# Patient Record
Sex: MALE | Race: WHITE
[De-identification: names, ages, dates, MRNs, and addresses within clinical notes are randomized per-mention and may not be internally consistent; named-entity substitution may affect disease eponyms.]

---

## 2019-11-25 ENCOUNTER — HOSPITAL ENCOUNTER (EMERGENCY)
Dept: HOSPITAL 97 - ER | Age: 39
Discharge: HOME | End: 2019-11-25
Payer: COMMERCIAL

## 2019-11-25 VITALS — SYSTOLIC BLOOD PRESSURE: 108 MMHG | OXYGEN SATURATION: 97 % | DIASTOLIC BLOOD PRESSURE: 70 MMHG

## 2019-11-25 VITALS — TEMPERATURE: 98.4 F
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
## 2019-11-25 DIAGNOSIS — S40.021A: ICD-10-CM

## 2019-11-25 DIAGNOSIS — S20.221A: Primary | ICD-10-CM
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
## 2019-11-25 LAB
BUN BLD-MCNC: 12 MG/DL (ref 7–18)
GLUCOSE SERPLBLD-MCNC: 99 MG/DL (ref 74–106)
HCT VFR BLD CALC: 39.1 % (ref 39.6–49)
LYMPHOCYTES # SPEC AUTO: 1.2 K/UL (ref 0.7–4.9)
PMV BLD: 8.3 FL (ref 7.6–11.3)
POTASSIUM SERPL-SCNC: 3.8 MMOL/L (ref 3.5–5.1)
RBC # BLD: 4.24 M/UL (ref 4.33–5.43)

## 2019-11-25 PROCEDURE — 36415 COLL VENOUS BLD VENIPUNCTURE: CPT

## 2019-11-25 PROCEDURE — 96374 THER/PROPH/DIAG INJ IV PUSH: CPT

## 2019-11-25 PROCEDURE — 96375 TX/PRO/DX INJ NEW DRUG ADDON: CPT

## 2019-11-25 PROCEDURE — 85025 COMPLETE CBC W/AUTO DIFF WBC: CPT

## 2019-11-25 PROCEDURE — 74177 CT ABD & PELVIS W/CONTRAST: CPT

## 2019-11-25 PROCEDURE — 80048 BASIC METABOLIC PNL TOTAL CA: CPT

## 2019-11-25 PROCEDURE — 99284 EMERGENCY DEPT VISIT MOD MDM: CPT

## 2019-11-25 NOTE — ER
Nurse's Notes                                                                                     

 [facility]                                                                 

Name: Arnold Patricia Jr                                                                        

Age: 39 yrs                                                                                       

Sex: Male                                                                                         

: 1980                                                                                   

MRN: [id number]                                                                                   

Arrival Date: 2019                                                                          

Time: 12:13                                                                                       

Account#: [id number]                                                                            

Bed 20                                                                                            

Private MD: Unknown, Unknown                                                                      

Diagnosis: Contusion of right back wall of thorax;Contusion of right upper arm                    

                                                                                                  

Presentation:                                                                                     

                                                                                             

12:14 Presenting complaint: Patient states: at about 0500 I fell down the stairs at home from la1 

      the second to first story, pain in lower back, denies any numbness or loss of               

      continence. Transition of care: patient was not received from another setting of care.      

      Onset of symptoms was 2019. Risk Assessment: Do you want to hurt yourself      

      or someone else? Patient reports no desire to harm self or others. Initial Sepsis           

      Screen: Does the patient meet any 2 criteria? No. Patient's initial sepsis screen is        

      negative. Does the patient have a suspected source of infection? No. Patient's initial      

      sepsis screen is negative. Care prior to arrival: None.                                     

12:14 Method Of Arrival: Wheelchair                                                           la1 

12:14 Acuity: MAIDA 3                                                                           la1 

                                                                                                  

Triage Assessment:                                                                                

12:31 General: Appears in no apparent distress. uncomfortable, Behavior is cooperative,       la1 

      appropriate for age, anxious. Pain: Complains of pain in back. EENT: No deficits noted.     

      Neuro: No deficits noted. Cardiovascular: No deficits noted. Respiratory: No deficits       

      noted. GI: No signs and/or symptoms were reported involving the gastrointestinal            

      system. : No signs and/or symptoms were reported regarding the genitourinary system.      

      Derm: No deficits noted. Musculoskeletal: Circulation, motion, and sensation intact.        

      Range of motion: intact in all extremities.                                                 

                                                                                                  

Historical:                                                                                       

- Allergies:                                                                                      

12:15 No Known Allergies;                                                                     la1 

- PMHx:                                                                                           

12:15 None;                                                                                   la1 

                                                                                                  

- Immunization history:: Adult Immunizations up to date.                                          

- Social history:: Smoking status: Patient/guardian denies using tobacco.                         

- Ebola Screening: : No symptoms or risks identified at this time.                                

                                                                                                  

                                                                                                  

Screenin:38 Abuse screen: Denies threats or abuse. Denies injuries from another. Nutritional        la1 

      screening: No deficits noted. Tuberculosis screening: No symptoms or risk factors           

      identified. Fall Risk None identified.                                                      

                                                                                                  

Assessment:                                                                                       

12:37 General: SEE TRIAGE NOTE.                                                               la1 

14:29 Reassessment: ALL CURRENT ORDERS COMPLETED, RESULTS PENDING FOR DISPO.                  bp  

15:09 Reassessment: PT D/C HOME AMBULATORY, DX WITH BACK CONTUSION.                           bp  

                                                                                                  

Vital Signs:                                                                                      

12:15  / 73; Pulse 89; Resp 16; Temp 98.4; Pulse Ox 100% on R/A; Weight 108.86 kg;      la1 

      Height 6 ft. 0 in. (182.88 cm);                                                             

14:29  / 70; Pulse 75; Resp 16; Pulse Ox 97% ;                                          bp  

12:15 Body Mass Index 32.55 (108.86 kg, 182.88 cm)                                            la1 

                                                                                                  

ED Course:                                                                                        

12:13 Patient arrived in ED.                                                                  ag5 

12:13 Unknown, Unknown is Private Physician.                                                  ag5 

12:15 Triage completed.                                                                       la1 

12:15 Arm band placed on left wrist.                                                          la1 

12:23 Jovanni Hernandez PA is PHCP.                                                               [id number] 

12:23 Darrius Sheth MD is Attending Physician.                                             [id number] 

12:27 Gil Law, KIM is Primary Nurse.                                                       la1 

12:38 Patient has correct armband on for positive identification. Bed in low position. Call   la1 

      light in reach. Side rails up X2.                                                           

12:50 Inserted saline lock: 20 gauge in right antecubital area, using aseptic technique.      la1 

      Blood collected.                                                                            

13:59 CT Abd/Pelvis - IV Contrast Only In Process Unspecified.                                EDMS

15:09 No provider procedures requiring assistance completed. IV discontinued, intact,         bp  

      bleeding controlled, No redness/swelling at site. Pressure dressing applied.                

                                                                                                  

Administered Medications:                                                                         

12:50 Drug: fentaNYL (PF) 75 mcg Route: IVP; Site: right antecubital;                         la1 

14:30 Follow up: Response: Pain is decreased                                                  bp  

12:50 Drug: Zofran 4 mg Route: IVP; Site: right antecubital;                                  la1 

14:30 Follow up: Response: No adverse reaction                                                bp  

                                                                                                  

                                                                                                  

Outcome:                                                                                          

14:43 Discharge ordered by MD.                                                                [id number] 

15:09 Discharged to home ambulatory.                                                          bp  

15:09 Condition: stable                                                                           

15:09 Discharge instructions given to patient, Instructed on discharge instructions, follow       

      up and referral plans. medication usage, Demonstrated understanding of instructions,        

      follow-up care, medications, Prescriptions given X 3.                                       

15:10 Patient left the ED.                                                                    bp  

                                                                                                  

Signatures:                                                                                       

Dispatcher [employer]                           EDMS                                                 

Jovanni Hernandez PA PA   jr8                                                  

Gil Law RN                         RN   la1                                                  

Cory Sevilla RN                      RN                                                      

Cheyanne Eric                                5                                                  

                                                                                                  

Corrections: (The following items were deleted from the chart)                                    

12:18 12:14 Presenting complaint: Patient states: at about 0500 I fell down the stairs at     la1 

      home from the second to first story, pain in lower back, denies any numbness or loss of     

      continence [facility]                                                                              

                                                                                                  

**************************************************************************************************

## 2019-11-25 NOTE — EDPHYS
Physician Documentation                                                                           

 [facility]                                                                 

Name: Arnold Patricia Jr                                                                        

Age: 39 yrs                                                                                       

Sex: Male                                                                                         

: 1980                                                                                   

MRN: [id number]                                                                                   

Arrival Date: 2019                                                                          

Time: 12:13                                                                                       

Account#: [id number]                                                                            

Bed 20                                                                                            

Private MD: Unknown, Unknown                                                                      

ED Physician Darrius Sheth                                                                      

HPI:                                                                                              

                                                                                             

14:59 This 39 yrs old  Male presents to ER via Wheelchair with complaints of Fall    jr8 

      Injury, Back Injury.                                                                        

14:59 Details of fall: The patient fell from a height, down approximately 10 stairs. Onset:   jr8 

      The symptoms/episode began/occurred acutely, today. Associated injuries: The patient        

      sustained injury to the low back, right arm. Severity of symptoms: At their worst the       

      symptoms were moderate, in the emergency department the symptoms are unchanged. The         

      patient has not experienced similar symptoms in the past. The patient has not recently      

      seen a physician. Patient stated that he was going down his stairs at house. Tripped on     

      one and fell sliding down the rest. Pain to right arm and low back. Denies LOC .            

                                                                                                  

Historical:                                                                                       

- Allergies:                                                                                      

12:15 No Known Allergies;                                                                     la1 

- PMHx:                                                                                           

12:15 None;                                                                                   la1 

                                                                                                  

- Immunization history:: Adult Immunizations up to date.                                          

- Social history:: Smoking status: Patient/guardian denies using tobacco.                         

- Ebola Screening: : No symptoms or risks identified at this time.                                

                                                                                                  

                                                                                                  

ROS:                                                                                              

14:59 Eyes: Negative for injury, pain, redness, and discharge, ENT: Negative for injury,      jr8 

      pain, and discharge, Neck: Negative for injury, pain, and swelling, Cardiovascular:         

      Negative for chest pain, palpitations, and edema, Respiratory: Negative for shortness       

      of breath, cough, wheezing, and pleuritic chest pain, Abdomen/GI: Negative for              

      abdominal pain, nausea, vomiting, diarrhea, and constipation, Skin: Negative for            

      injury, rash, and discoloration, Neuro: Negative for headache, weakness, numbness,          

      tingling, and seizure.                                                                      

14:59 Back: Positive for pain at rest, pain with movement, of the lumbar area and low back        

      area.                                                                                       

14:59 MS/extremity: Positive for ecchymosis, pain, tenderness, of the right arm.                  

                                                                                                  

Exam:                                                                                             

15:05 Head/Face:  Normocephalic, atraumatic. Eyes:  Pupils equal round and reactive to light, jr8 

      extra-ocular motions intact.  Lids and lashes normal.  Conjunctiva and sclera are           

      non-icteric and not injected.  Cornea within normal limits.  Periorbital areas with no      

      swelling, redness, or edema. ENT:  Nares patent. No nasal discharge, no septal              

      abnormalities noted.  Tympanic membranes are normal and external auditory canals are        

      clear.  Oropharynx with no redness, swelling, or masses, exudates, or evidence of           

      obstruction, uvula midline.  Mucous membranes moist. Neck:  Trachea midline, no             

      thyromegaly or masses palpated, and no cervical lymphadenopathy.  Supple, full range of     

      motion without nuchal rigidity, or vertebral point tenderness.  No Meningismus.             

      Chest/axilla:  Normal chest wall appearance and motion.  Nontender with no deformity.       

      No lesions are appreciated. Cardiovascular:  Regular rate and rhythm with a normal S1       

      and S2.  No gallops, murmurs, or rubs.  Normal PMI, no JVD.  No pulse deficits.             

      Respiratory:  Lungs have equal breath sounds bilaterally, clear to auscultation and         

      percussion.  No rales, rhonchi or wheezes noted.  No increased work of breathing, no        

      retractions or nasal flaring. Abdomen/GI:  Soft, non-tender, with normal bowel sounds.      

      No distension or tympany.  No guarding or rebound.  No evidence of tenderness               

      throughout. Skin:  Warm, dry with normal turgor.  Normal color with no rashes, no           

      lesions, and no evidence of cellulitis. Neuro:  Awake and alert, GCS 15, oriented to        

      person, place, time, and situation.  Cranial nerves II-XII grossly intact.  Motor           

      strength 5/5 in all extremities.  Sensory grossly intact.  Cerebellar exam normal.          

      Normal gait.                                                                                

15:05 Back: pain, that is moderate, of the  lumbar area and low back area, ROM is painful,        

      normal spinal alignment noted, bruising noted to left mid back.                             

15:05 Musculoskeletal/extremity: Extremities: grossly normal except: noted in the right arm:      

      ecchymosis, right forearm and biceps region , ROM: intact in all extremities,               

      Circulation is intact in all extremities. Sensation intact.                                 

                                                                                                  

Vital Signs:                                                                                      

12:15  / 73; Pulse 89; Resp 16; Temp 98.4; Pulse Ox 100% on R/A; Weight 108.86 kg;      la1 

      Height 6 ft. 0 in. (182.88 cm);                                                             

14:29  / 70; Pulse 75; Resp 16; Pulse Ox 97% ;                                          bp  

12:15 Body Mass Index 32.55 (108.86 kg, 182.88 cm)                                            la1 

                                                                                                  

MDM:                                                                                              

12:23 Patient medically screened.                                                             jr8 

15:07 Data reviewed: vital signs, nurses notes, radiologic studies, CT scan. Data             jr8 

      interpreted: Pulse oximetry: on room air is 97 %. Interpretation: normal. Counseling: I     

      had a detailed discussion with the patient and/or guardian regarding: the historical        

      points, exam findings, and any diagnostic results supporting the discharge/admit            

      diagnosis, radiology results, the need for outpatient follow up, a family practitioner,     

      to return to the emergency department if symptoms worsen or persist or if there are any     

      questions or concerns that arise at home.                                                   

                                                                                                  

                                                                                             

12:37 Order name: CBC with Diff; Complete Time: 15:07                                         jr8 

                                                                                             

12:37 Order name: Basic Metabolic Panel; Complete Time: 14:00                                 jr8 

                                                                                             

12:37 Order name: IV; Complete Time: 12:53                                                    jr8 

                                                                                             

12:37 Order name: CT Abd/Pelvis - IV Contrast Only; Complete Time: 14:33                      jr8 

                                                                                                  

Administered Medications:                                                                         

12:50 Drug: fentaNYL (PF) 75 mcg Route: IVP; Site: right antecubital;                         la1 

14:30 Follow up: Response: Pain is decreased                                                  bp  

12:50 Drug: Zofran 4 mg Route: IVP; Site: right antecubital;                                  la1 

14:30 Follow up: Response: No adverse reaction                                                bp  

                                                                                                  

                                                                                                  

Disposition:                                                                                      

18:20 Co-signature as Attending Physician, Darrius Sheth MD Did not see or evaluate patient. ps1 

      Signing chart for administrative purposes. Not an endorsement of care provided. .           

                                                                                                  

Disposition:                                                                                      

19 14:43 Discharged to Home. Impression: Contusion of right back wall of thorax,            

  Contusion of right upper arm.                                                                   

- Condition is Stable.                                                                            

- Discharge Instructions: Contusion.                                                              

- Prescriptions for Ibuprofen 800 mg Oral Tablet - take 1 tablet by ORAL route every 12           

  hours As needed take with food; 20 tablet. Tylenol- Codeine #3 300-30 mg Oral Tablet            

  - take 2 tablets by ORAL route every 6 hours As needed; 12 tablet. Robaxin 500 mg               

  Oral Tablet - take 2 tablet by ORAL route every 6 hours As needed; 40 tablet.                   

- Medication Reconciliation Form, Thank You Letter, Antibiotic Education, Prescription            

  Opioid Use form.                                                                                

- Follow up: Private Physician; When: 2 - 3 days; Reason: Recheck today's complaints,             

  Continuance of care, Re-evaluation by your physician.                                           

- Problem is new.                                                                                 

- Symptoms have improved.                                                                         

                                                                                                  

                                                                                                  

                                                                                                  

Signatures:                                                                                       

Dispatcher [employer]                           EDMS                                                 

Jovanni Hernandez PA PA   jr8                                                  

Gil Law RN                         RN   la1                                                  

Cory Sevilla RN                      RN   bp                                                   

Darrius Sheth MD MD   ps1                                                  

                                                                                                  

Corrections: (The following items were deleted from the chart)                                    

15:10 14:43 2019 14:43 Discharged to Home. Impression: Contusion of right back wall of  bp  

      thorax; Contusion of right upper arm. Condition is Stable. Forms are Medication             

      Reconciliation Form, Thank You Letter, Antibiotic Education, Prescription Opioid Use.       

      Follow up: Private Physician; When: 2 - 3 days; Reason: Recheck today's complaints,         

      Continuance of care, Re-evaluation by your physician. Problem is new. Symptoms have         

      improved. jr8                                                                               

                                                                                                  

**************************************************************************************************

## 2019-11-25 NOTE — RAD REPORT
EXAM DESCRIPTION:  CT - Abdomen   Pelvis W Contrast - 11/25/2019 1:37 pm

 

CLINICAL HISTORY:  TRAUMAabdominal pain, trauma

 

COMPARISON:  None.

 

TECHNIQUE:  Biphasic, helical CT imaging of the abdomen and pelvis was performed following 100 ml non
-ionic IV contrast. No oral contrast.

 

All CT scans are performed using dose optimization technique as appropriate and may include automated
 exposure control or mA/KV adjustment according to patient size.

 

FINDINGS:  No suspicious findings in the lung bases.

 

The liver, spleen, and pancreas show no suspicious findings. Gallbladder and biliary tree are also wi
thout suspicious finding.

 

Symmetric renal function is seen with no hydronephrosis or suspicious renal mass. No pyelonephritis o
r acute parenchymal process. No bladder abnormalities. No adrenal abnormalities.

 

No dilated bowel loops or bowel wall thickening.  No free air, free fluid or inflammatory stranding. 
 No hernia, mass or bulky lymphadenopathy.

 

No vertebral body compression fracture. L5-S1 disc space narrowing is present. Imaged portions of the
 ribcage show no suspicious findings. Hardware is in place in each femur.

 

 

IMPRESSION:  Contrast enhanced CT abdomen and pelvis showing no significant or suspicious finding.

## 2021-05-26 ENCOUNTER — HOSPITAL ENCOUNTER (EMERGENCY)
Dept: HOSPITAL 97 - ER | Age: 41
Discharge: HOME | End: 2021-05-26
Payer: COMMERCIAL

## 2021-05-26 VITALS — DIASTOLIC BLOOD PRESSURE: 84 MMHG | OXYGEN SATURATION: 95 % | SYSTOLIC BLOOD PRESSURE: 140 MMHG

## 2021-05-26 VITALS — TEMPERATURE: 98.2 F

## 2021-05-26 DIAGNOSIS — L03.211: Primary | ICD-10-CM

## 2021-05-26 PROCEDURE — 99283 EMERGENCY DEPT VISIT LOW MDM: CPT

## 2021-05-26 NOTE — EDPHYS
Physician Documentation                                                                           

 [facility]                                                                 

Name: Arnold Patricia Jr                                                                        

Age: 40 yrs                                                                                       

Sex: Male                                                                                         

: 1980                                                                                   

MRN: [id number]                                                                                   

Arrival Date: 2021                                                                          

Time: 09:17                                                                                       

Account#: [id number]                                                                            

Bed 19                                                                                            

Private MD: Carloz Schwartz S                                                                    

ED Physician Ulices Sam                                                                             

HPI:                                                                                              

                                                                                             

09:30 This 40 yrs old  Male presents to ER via Ambulatory with complaints of Abscess.jmm 

09:30 The patient presents with an abscess of the inner aspect of right eyebrow. Onset: The   jmm 

      symptoms/episode began/occurred gradually, 3 day(s) ago. Possible cause(s): unknown.        

      Associated signs and symptoms: Pertinent positives: swelling, Pertinent negatives:          

      fever. Modifying factors: the symptoms are alleviated by nothing, the symptoms are          

      aggravated by nothing. The patient has not experienced similar symptoms in the past.        

                                                                                                  

Historical:                                                                                       

- Allergies:                                                                                      

09:30 No Known Allergies;                                                                     ld1 

- Home Meds:                                                                                      

09:30 None [Active];                                                                          ld1 

- PMHx:                                                                                           

09:30 None;                                                                                   ld1 

- PSHx:                                                                                           

09:30 None;                                                                                   ld1 

                                                                                                  

- Immunization history:: Adult Immunizations up to date.                                          

- Social history:: Smoking status: Patient denies any tobacco usage or history of.                

  Patient uses alcohol, occasionally.                                                             

                                                                                                  

                                                                                                  

ROS:                                                                                              

09:30 Constitutional: Negative for fever, chills, and weight loss, Cardiovascular: Negative   jmm 

      for chest pain, palpitations, and edema, Respiratory: Negative for shortness of breath,     

      cough, wheezing, and pleuritic chest pain.                                                  

09:30 Skin: Positive for erythema, swelling.                                                      

09:30 All other systems are negative.                                                             

                                                                                                  

Exam:                                                                                             

09:30 Constitutional:  This is a well developed, well nourished patient who is awake, alert,  jmm 

      and in no acute distress.                                                                   

09:30 ENT:  Moist Mucus Membranes Neck:  Trachea midline, Supple Chest/axilla:  Normal chest      

      wall appearance and motion.   Cardiovascular:  Regular rate and rhythm.  No edema           

      appreciated Respiratory:  Normal respirations, no respiratory distress appreciated          

      Abdomen/GI:  Non distended, soft Back:  Normal ROM                                          

09:30 Head/face: swelling and erythema noted to the right eyebrow. non fluctuant mass             

      appreciated.                                                                                

09:30 Skin: small indurated area appreciated to the right eyebrow, TTP, .                         

09:30 Neuro: Orientation: is normal, Mentation: is normal, Memory: is normal.                     

09:30 Psych: Behavior/mood is pleasant, cooperative.                                              

                                                                                                  

Vital Signs:                                                                                      

09:27  / 100; Pulse 96; Resp 18; Temp 98.2(O); Pulse Ox 98% on R/A; Pain 6/10;          ld1 

09:38  / 101; Pulse 88; Pulse Ox 97% on R/A; Pain 7/10;                                 ap3 

09:51  / 84; Pulse 86; Pulse Ox 95% on R/A;                                             ap3 

                                                                                                  

MDM:                                                                                              

09:30 Patient medically screened.                                                             [facility] 

09:32 Data reviewed: vital signs, nurses notes. Counseling: I had a detailed discussion with  nayely 

      the patient and/or guardian regarding: the historical points, exam findings, and any        

      diagnostic results supporting the discharge/admit diagnosis, the need for outpatient        

      follow up, to return to the emergency department if symptoms worsen or persist or if        

      there are any questions or concerns that arise at home. ED course: Patient is alert and     

      non toxic malcolm appearance in the ED. PE does not appear that the abscess has formed yet.     

      Patient advised to follow up with gen surgery and otherwise given strict return             

      precautions. patient understood and agrees with the plan of care. .                         

                                                                                                  

Administered Medications:                                                                         

No medications were administered                                                                  

                                                                                                  

                                                                                                  

Disposition:                                                                                      

10:59 Co-signature as Attending Physician, Ulices Sam MD.                                        regi 

                                                                                                  

Disposition:                                                                                      

21 09:34 Discharged to Home. Impression: Facial Cellulitis.                                 

- Condition is Stable.                                                                            

- Discharge Instructions: Skin Abscess, Cellulitis, Adult.                                        

- Prescriptions for Doxycycline Hyclate 100 mg Oral Tablet - take 1 tablet by ORAL                

  route every 12 hours; 20 tablet. Bactrim - 160 mg Oral Tablet - take 1 tablet             

  by ORAL route every 12 hours for 10 days; 20 tablet.                                            

- Medication Reconciliation Form, Thank You Letter, Antibiotic Education, Prescription            

  Opioid Use form.                                                                                

- Follow up: Ernesto Turk MD; When: 2 - 3 days; Reason: Recheck today's complaints,              

  Continuance of care, Re-evaluation by your physician.                                           

                                                                                                  

                                                                                                  

                                                                                                  

Signatures:                                                                                       

Ulices Sam MD MD pkl Mickail, Joel, PA PA jmm Prokisch, Amanda RN                    RN   ap3                                                  

Andreina House RN                     RN   ld1                                                  

                                                                                                  

Corrections: (The following items were deleted from the chart)                                    

10:03 09:34 2021 09:34 Discharged to Home. Impression: Facial Cellulitis. Condition is  ap3 

      Stable. Forms are Medication Reconciliation Form, Thank You Letter, Antibiotic              

      Education, Prescription Opioid Use. Follow up: Ernesto Turk; When: 2 - 3 days; Reason:       

      Recheck today's complaints, Continuance of care, Re-evaluation by your physician. nayely       

                                                                                                  

**************************************************************************************************

## 2021-05-26 NOTE — ER
Nurse's Notes                                                                                     

 [facility]                                                                 

Name: Arnold Patricia Jr                                                                        

Age: 40 yrs                                                                                       

Sex: Male                                                                                         

: 1980                                                                                   

MRN: [id number]                                                                                   

Arrival Date: 2021                                                                          

Time: 09:17                                                                                       

Account#: [id number]                                                                            

Bed 19                                                                                            

Private MD: Carloz Schwartz S                                                                    

Diagnosis: Facial Cellulitis                                                                      

                                                                                                  

Presentation:                                                                                     

                                                                                             

09:27 Chief complaint: Patient states: Reports abscess on right eye brow, showed up three     ld1 

      days ago. Coronavirus screen: At this time, the client does not indicate any symptoms       

      associated with coronavirus-19. Ebola Screen: No symptoms or risks identified at this       

      time. Initial Sepsis Screen: Does the patient meet any 2 criteria? No. Patient's            

      initial sepsis screen is negative. Does the patient have a suspected source of              

      infection? No. Patient's initial sepsis screen is negative. Risk Assessment: Do you         

      want to hurt yourself or someone else? Patient reports no desire to harm self or            

      others. Onset of symptoms was May 23, 2021.                                                 

09: Method Of Arrival: Ambulatory                                                           ld1 

09:27 Acuity: MAIDA 3                                                                           ld1 

                                                                                                  

Triage Assessment:                                                                                

:30 General: Appears in no apparent distress. comfortable, Behavior is calm, cooperative,   ld1 

      appropriate for age. Pain: Complains of pain in inner aspect of right eyebrow Pain does     

      not radiate. Pain currently is 6 out of 10 on a pain scale. Quality of pain is              

      described as burning, stabbing. EENT: Eyes abscess to right eye brow x 3 days. Neuro:       

      Level of Consciousness is awake, alert, obeys commands, Oriented to person, place,          

      time, situation, Appropriate for age. Cardiovascular: Capillary refill < 3 seconds          

      Patient's skin is warm and dry. Respiratory: Airway is patent Respiratory effort is         

      even, unlabored, Respiratory pattern is regular, symmetrical. GI: Abdomen is round          

      non-distended. : No signs and/or symptoms were reported regarding the genitourinary       

      system. Derm: Abscess located on inner aspect of right eyebrow Reports burning,             

      itching. Musculoskeletal: No signs and/or symptoms reported regarding the                   

      musculoskeletal system.                                                                     

                                                                                                  

Historical:                                                                                       

- Allergies:                                                                                      

09: No Known Allergies;                                                                     ld1 

- Home Meds:                                                                                      

: None [Active];                                                                          ld1 

- PMHx:                                                                                           

: None;                                                                                   ld1 

- PSHx:                                                                                           

09:30 None;                                                                                   ld1 

                                                                                                  

- Immunization history:: Adult Immunizations up to date.                                          

- Social history:: Smoking status: Patient denies any tobacco usage or history of.                

  Patient uses alcohol, occasionally.                                                             

                                                                                                  

                                                                                                  

Screenin:38 Abuse screen: Denies threats or abuse. Nutritional screening: No deficits noted.        ap3 

      Tuberculosis screening: No symptoms or risk factors identified. Fall Risk None              

      identified.                                                                                 

                                                                                                  

Assessment:                                                                                       

09:34 General: Appears in no apparent distress. distressed, Behavior is calm, cooperative,    ap3 

      appropriate for age. Pain: Complains of pain in inner aspect of right eyebrow Pain does     

      not radiate. Pain currently is 7 out of 10 on a pain scale. Pain began 3 days ago           

      Aggravated by touch. Neuro: Level of Consciousness is awake, alert, obeys commands,         

      Oriented to person, place, time, situation. Cardiovascular: Capillary refill < 3            

      seconds. Respiratory: Airway is patent Respiratory effort is even, unlabored,               

      Respiratory pattern is regular, symmetrical. GI: No signs and/or symptoms were reported     

      involving the gastrointestinal system. : No signs and/or symptoms were reported           

      regarding the genitourinary system. EENT: Eyes wound noted on right inner eyebrow.          

      Derm: Skin is pink, warm \T\ dry. Wound noted inner aspect of right eyebrow.                

                                                                                                  

Vital Signs:                                                                                      

09:27  / 100; Pulse 96; Resp 18; Temp 98.2(O); Pulse Ox 98% on R/A; Pain 6/10;          ld1 

09:38  / 101; Pulse 88; Pulse Ox 97% on R/A; Pain 7/10;                                 ap3 

09:51  / 84; Pulse 86; Pulse Ox 95% on R/A;                                             ap3 

                                                                                                  

ED Course:                                                                                        

09:17 Patient arrived in ED.                                                                  mr  

09:17 Carloz Schwartz MD is Private Physician.                                               mr  

09[facility]18 [facility]                                                              [facility] 

09:18 Ulices Sam MD is Attending Physician.                                                    jm 

09:29 Triage completed.                                                                       ld1 

09:30 Arm band placed on right wrist. Patient placed in an exam room, on a stretcher, on      ld1 

      pulse oximetry.                                                                             

09:33 Ernesto Turk MD is Referral Physician.                                                 jm 

09:34 Hoa Hurt, RN is Primary Nurse.                                                  ap3 

09:38 Patient has correct armband on for positive identification. Pulse ox on. NIBP on. Door  ap3 

      closed. Noise minimized.                                                                    

09:52 Patient did not have IV access during this emergency room visit.                        ap3 

10:02 No provider procedures requiring assistance completed.                                  ap3 

                                                                                                  

Administered Medications:                                                                         

No medications were administered                                                                  

                                                                                                  

                                                                                                  

Outcome:                                                                                          

:34 Discharge ordered by MD.                                                                nayely 

10:02 Discharged to home ambulatory.                                                          ap3 

10:02 Condition: good                                                                             

10:02 Discharge instructions given to patient, Instructed on discharge instructions, follow       

      up and referral plans. no drinking with medication, Demonstrated understanding of           

      instructions, follow-up care, medications, Prescriptions given X 2.                         

10:03 Patient left the ED.                                                                    ap3 

                                                                                                  

Signatures:                                                                                       

Emmett Lloyd PA PA jmm Rivera, Mary                                 mr                                                   

Hoa Hurt RN                    RN   ap3                                                  

Andreina House RN                     RN   ld1                                                  

                                                                                                  

**************************************************************************************************